# Patient Record
Sex: MALE | Race: WHITE | NOT HISPANIC OR LATINO | Employment: FULL TIME | URBAN - NONMETROPOLITAN AREA
[De-identification: names, ages, dates, MRNs, and addresses within clinical notes are randomized per-mention and may not be internally consistent; named-entity substitution may affect disease eponyms.]

---

## 2022-12-14 ENCOUNTER — OFFICE VISIT (OUTPATIENT)
Dept: CARDIOLOGY | Facility: CLINIC | Age: 52
End: 2022-12-14

## 2022-12-14 VITALS
OXYGEN SATURATION: 96 % | WEIGHT: 207.2 LBS | HEIGHT: 74 IN | BODY MASS INDEX: 26.59 KG/M2 | HEART RATE: 85 BPM | DIASTOLIC BLOOD PRESSURE: 116 MMHG | SYSTOLIC BLOOD PRESSURE: 196 MMHG

## 2022-12-14 DIAGNOSIS — E87.6 HYPOKALEMIA: ICD-10-CM

## 2022-12-14 DIAGNOSIS — I10 PRIMARY HYPERTENSION: ICD-10-CM

## 2022-12-14 DIAGNOSIS — I48.91 NEW ONSET A-FIB: Primary | ICD-10-CM

## 2022-12-14 PROCEDURE — 93000 ELECTROCARDIOGRAM COMPLETE: CPT | Performed by: NURSE PRACTITIONER

## 2022-12-14 PROCEDURE — 99204 OFFICE O/P NEW MOD 45 MIN: CPT | Performed by: NURSE PRACTITIONER

## 2022-12-14 RX ORDER — CLONIDINE 0.1 MG/24H
1 PATCH, EXTENDED RELEASE TRANSDERMAL WEEKLY
Qty: 4 PATCH | Refills: 6 | Status: SHIPPED | OUTPATIENT
Start: 2022-12-14

## 2022-12-14 RX ORDER — CYCLOBENZAPRINE HCL 10 MG
10 TABLET ORAL 3 TIMES DAILY PRN
COMMUNITY

## 2022-12-14 RX ORDER — AMLODIPINE BESYLATE 10 MG/1
10 TABLET ORAL DAILY
COMMUNITY

## 2022-12-14 RX ORDER — CLONIDINE HYDROCHLORIDE 0.1 MG/1
0.1 TABLET ORAL 3 TIMES DAILY PRN
COMMUNITY

## 2022-12-14 RX ORDER — LISINOPRIL 40 MG/1
40 TABLET ORAL 2 TIMES DAILY
COMMUNITY

## 2022-12-14 RX ORDER — HYDROCHLOROTHIAZIDE 25 MG/1
25 TABLET ORAL DAILY
COMMUNITY
End: 2022-12-14 | Stop reason: SDUPTHER

## 2022-12-14 RX ORDER — TRAMADOL HYDROCHLORIDE 50 MG/1
50 TABLET ORAL EVERY 6 HOURS PRN
COMMUNITY

## 2022-12-14 RX ORDER — CARVEDILOL 25 MG/1
25 TABLET ORAL DAILY
COMMUNITY

## 2022-12-14 NOTE — PROGRESS NOTES
Subjective     Robson Burton is a 52 y.o. male who presents to day for Atrial Fibrillation and Hypertension.    CHIEF COMPLIANT  Chief Complaint   Patient presents with   • Atrial Fibrillation   • Hypertension       Active Problems:  Problem List Items Addressed This Visit    None  Visit Diagnoses     New onset a-fib (HCC)    -  Primary    Relevant Medications    carvedilol (COREG) 25 MG tablet    amLODIPine (NORVASC) 10 MG tablet    Other Relevant Orders    Adult Transthoracic Echo Complete W/ Cont if Necessary Per Protocol    Stress Test With Myocardial Perfusion One Day    Cardiac Event Monitor    Primary hypertension        Relevant Medications    lisinopril (PRINIVIL,ZESTRIL) 40 MG tablet    carvedilol (COREG) 25 MG tablet    amLODIPine (NORVASC) 10 MG tablet    cloNIDine (CATAPRES) 0.1 MG tablet    cloNIDine (CATAPRES-TTS) 0.1 MG/24HR patch    Other Relevant Orders    Adult Transthoracic Echo Complete W/ Cont if Necessary Per Protocol    Stress Test With Myocardial Perfusion One Day    Cardiac Event Monitor    Hypokalemia        Relevant Orders    Basic Metabolic Panel          HPI  HPI   Mr. Robson Burton is a 52-year-old-male who presents to clinic today, accompanied by an adult female. for new onset atrial fibrillation as a new patient. He is currently anticoagulated with Eliquis rate controlled with carvedilol. He also have chronic arterial hypertension which he has amlodipine, carvedilol, lisinopril, and hydrochlorothiazide. Today on arrival, his blood pressure is substantially elevated at 196/116 mmHg, heart rate of 85 bpm. He also takes clonidine as needed.     The patient acknowledges completing an echocardiogram which showed atrial fibrillation. He states he was prescribed Eliquis. He denies being informed about atrial fibrillation. He denies chest discomfort or chest tightness.    No signs of infection on labs. Hemoglobin is slightly high. CBC is unremarkable. Normal platelets. Kidney function  is slightly off at 1.37.  Which gives him and estimated GFR of 61, 16 above is considered normal. Creatinine is slightly elevated, overall filtration of the kidneys is at the bottom end of normal, so slight kidney compromise. Potassium is 2.9. He denies being given potassium. He denies having it rechecked since they treated the potassium.    The patient has a CHADS2-VASC score of 1 which puts him at a 0.6% chance of having a stroke. Has Bled score is a 2 which puts him at 1.88% of bleeding per year.    He is taking hydrochlorothiazide. He denies any swelling. The adult female states that he gets up to urinate about 3 to 4 times in the middle of the night while taking this medication.  He is currently on amlodipine, carvedilol, lisinopril for his chronic arterial hypertension. He denies his blood pressure being as high as it was today. At home he states it's 168/95 mmHg, so still elevated.     He denies any chest pain, shortness of breath, lower extremity edema, palpitations, fatigue, dizziness, lightheadedness, syncope, PND, orthopnea, or strokelike symptoms.    PRIOR MEDS  Current Outpatient Medications on File Prior to Visit   Medication Sig Dispense Refill   • amLODIPine (NORVASC) 10 MG tablet Take 10 mg by mouth Daily.     • apixaban (ELIQUIS) 5 MG tablet tablet Take 5 mg by mouth 2 (Two) Times a Day.     • carvedilol (COREG) 25 MG tablet Take 25 mg by mouth Daily.     • cloNIDine (CATAPRES) 0.1 MG tablet Take 0.1 mg by mouth 3 (Three) Times a Day As Needed for High Blood Pressure. Take 1-2 tabs po up to three times a day prn     • cyclobenzaprine (FLEXERIL) 10 MG tablet Take 10 mg by mouth 3 (Three) Times a Day As Needed for Muscle Spasms.     • lisinopril (PRINIVIL,ZESTRIL) 40 MG tablet Take 40 mg by mouth 2 (Two) Times a Day.     • traMADol (ULTRAM) 50 MG tablet Take 50 mg by mouth Every 6 (Six) Hours As Needed for Moderate Pain.       No current facility-administered medications on file prior to visit.  "      ALLERGIES  Patient has no known allergies.    HISTORY  Past Medical History:   Diagnosis Date   • Arrhythmia     a fib   • Hyperlipidemia    • Hypertension        Social History     Socioeconomic History   • Marital status:    Tobacco Use   • Smoking status: Every Day     Packs/day: 1.00     Years: 20.00     Pack years: 20.00     Types: Cigarettes   • Smokeless tobacco: Never   Vaping Use   • Vaping Use: Never used   Substance and Sexual Activity   • Alcohol use: Never   • Drug use: Not Currently     Comment: loratab   • Sexual activity: Defer       Family History   Problem Relation Age of Onset   • Hypertension Mother    • Diabetes Mother    • Heart disease Father    • Heart attack Father         stents       Review of Systems   Constitutional: Negative for chills, fatigue and fever.   HENT: Negative for congestion, rhinorrhea and sore throat.    Respiratory: Negative for chest tightness and shortness of breath.    Cardiovascular: Negative for chest pain, palpitations and leg swelling.   Gastrointestinal: Negative for constipation, diarrhea and nausea.   Musculoskeletal: Positive for back pain. Negative for arthralgias and neck pain.   Allergic/Immunologic: Positive for environmental allergies. Negative for food allergies.   Neurological: Negative for dizziness, syncope, weakness and light-headedness.   Hematological: Does not bruise/bleed easily.   Psychiatric/Behavioral: Negative for sleep disturbance.       Objective     VITALS: BP (!) 196/116 (BP Location: Right arm, Patient Position: Sitting, Cuff Size: Adult)   Pulse 85   Ht 188 cm (74\")   Wt 94 kg (207 lb 3.2 oz)   SpO2 96%   BMI 26.60 kg/m²     LABS:   Lab Results (most recent)     None          IMAGING:   No Images in the past 120 days found..    EXAM:  Physical Exam  Vitals and nursing note reviewed.   Constitutional:       Appearance: He is well-developed.   HENT:      Head: Normocephalic.   Eyes:      Pupils: Pupils are equal, round, " and reactive to light.   Neck:      Thyroid: No thyroid mass.      Vascular: No carotid bruit or JVD.      Trachea: Trachea and phonation normal.   Cardiovascular:      Rate and Rhythm: Normal rate and regular rhythm.      Pulses:           Radial pulses are 2+ on the right side and 2+ on the left side.        Posterior tibial pulses are 2+ on the right side and 2+ on the left side.      Heart sounds: Normal heart sounds. No murmur heard.    No friction rub. No gallop.   Pulmonary:      Effort: Pulmonary effort is normal. No respiratory distress.      Breath sounds: Normal breath sounds. No wheezing or rales.   Abdominal:      General: Bowel sounds are normal.      Palpations: Abdomen is soft.   Musculoskeletal:         General: No swelling. Normal range of motion.      Cervical back: Neck supple.   Skin:     General: Skin is warm and dry.      Capillary Refill: Capillary refill takes less than 2 seconds.      Findings: No rash.   Neurological:      Mental Status: He is alert and oriented to person, place, and time.   Psychiatric:         Speech: Speech normal.         Behavior: Behavior normal.         Thought Content: Thought content normal.         Judgment: Judgment normal.              Procedure     ECG 12 Lead    Date/Time: 12/14/2022 12:00 PM  Performed by: Santy Banegas APRN  Authorized by: Santy Banegas APRN   Comparison: compared with previous ECG from 10/22/2022  Comparison to previous ECG: Previously in atrial fibrillation with RVR  Rhythm: sinus rhythm  Rate: normal  BPM: 84  QRS axis: normal  Other findings: left ventricular hypertrophy  Comments:  ms  No acute changes          Assessment  1. Patient was shown to have atrial fibrillation with rapid ventricular response on echocardiogram. His rate got up to 131bpm on echocardiogram. Discussed atrial fibrillation in depth with patient.   2. Patient is not in atrial fibrillation today. I explained he has paroxysmal, where it comes and goes.  Discussed figuring out triggers and what may cause his atrial fibrillation.   3. Advised the patient to call right away if he notices that he is going in and out of A. fib and I will send in the medication and perform an EKG about 3 days after starting the medication, however explained that I will not put him on now because he might not have A. fib for a period amount of time.   4. Reviewed and discussed labs with the patient as well as treatment for any abnormal lab results.             Assessment & Plan    Diagnosis Plan   1. New onset a-fib (HCC)  Adult Transthoracic Echo Complete W/ Cont if Necessary Per Protocol    Stress Test With Myocardial Perfusion One Day    Cardiac Event Monitor      2. Primary hypertension  Adult Transthoracic Echo Complete W/ Cont if Necessary Per Protocol    Stress Test With Myocardial Perfusion One Day    Cardiac Event Monitor    cloNIDine (CATAPRES-TTS) 0.1 MG/24HR patch      3. Hypokalemia  Basic Metabolic Panel      Plan  1. Recommend journaling when he experiences atrial fibrillation and try to identify any potential symptoms..  2. Discussed an option of using antiarrhythmic therapy like flecainide and sotalol to help prevent issue.  However since patient is only had 1 episode of atrial fibrillation will defer antiarrhythmic therapy at this time.  However if it does recur we will discuss further at that time.  3. Discussed sending patient to an electrophysiologist if we are not able to control atrial fibrillation with medication to do an ablation. Explained it is a very invasive procedure.  4. Discussed another option is cardioversion where the heart is shocked back into rhythm, however it is not needed presently.  5. Discussed performing an echocardiogram to make sure heart structure is normal, no blood clots are present, pump function is normal, and no heart failure.  The echocardiogram would also rule out thrombotic burden.  6. Would provide heart monitor for 14 days. If going  in and out of A. fib then antiarrhythmic therapy is appropriate.  At this time we will continue aspirin for antiplatelet therapy.  If he does come back to have diastolic dysfunction with heart failure we will need to consider DOAC therapy.  7. Will stop hydrochlorothiazide due to no swelling and throwing off his electrolytes.   8. Discussed other symptoms of angina we will move forth with further testing at that time.  9. Will switch lisinopril to Olmesartan to see if we can better control his blood pressure.  He will monitor his blood pressure on a routine basis and report any guys or lows.  10. Will change Clonidine pill to a weekly patch.  11.  Informed of signs and symptoms of ACS and advised to seek emergent treatment for any new worsening symptoms.  Patient also advised sooner follow-up as needed.  Also advised to follow-up with family doctor as needed  This note is dictated utilizing voice recognition software.  Although this record has been proof read, transcriptional errors may still be present. If questions occur regarding the content of this record please do not hesitate to call our office.  I have reviewed and confirmed the accuracy of the ROS as documented by the MA/LPN/RN ELEAZAR Cruz      Return in about 3 months (around 3/14/2023), or if symptoms worsen or fail to improve.    Diagnoses and all orders for this visit:    1. New onset a-fib (HCC) (Primary)  -     Adult Transthoracic Echo Complete W/ Cont if Necessary Per Protocol; Future  -     Stress Test With Myocardial Perfusion One Day; Future  -     Cardiac Event Monitor; Future    2. Primary hypertension  -     Adult Transthoracic Echo Complete W/ Cont if Necessary Per Protocol; Future  -     Stress Test With Myocardial Perfusion One Day; Future  -     Cardiac Event Monitor; Future  -     cloNIDine (CATAPRES-TTS) 0.1 MG/24HR patch; Place 1 patch on the skin as directed by provider 1 (One) Time Per Week.  Dispense: 4 patch; Refill: 6    3.  Hypokalemia  -     Basic Metabolic Panel; Future    Other orders  -     ECG 12 Lead        Robson Burton  reports that he has been smoking cigarettes. He has a 20.00 pack-year smoking history. He has never used smokeless tobacco.. I have educated him on the risk of diseases from using tobacco products.      MEDS ORDERED DURING VISIT:  New Medications Ordered This Visit   Medications   • cloNIDine (CATAPRES-TTS) 0.1 MG/24HR patch     Sig: Place 1 patch on the skin as directed by provider 1 (One) Time Per Week.     Dispense:  4 patch     Refill:  6           This document has been electronically signed by ELEAZAR Cruz Jr.  December 15, 2022 22:26 EST      Transcribed from ambient dictation for ELEAZAR Cruz by Kerry Ryan.  12/14/22   18:06 EST    Patient or patient representative verbalized consent to the visit recording.  I have personally performed the services described in this document as transcribed by the above individual, and it is both accurate and complete.

## 2022-12-22 ENCOUNTER — TELEPHONE (OUTPATIENT)
Dept: CARDIOLOGY | Facility: CLINIC | Age: 52
End: 2022-12-22

## 2022-12-22 DIAGNOSIS — I47.20 VT (VENTRICULAR TACHYCARDIA): Primary | ICD-10-CM

## 2022-12-22 NOTE — TELEPHONE ENCOUNTER
Patient has 205.0 heart rate at 9:19 am . I called patient he was at work doing very physical activity . Per JR we will continue the same with medications and continue to watch the monitor.    Ruby MAHONEY

## 2022-12-27 ENCOUNTER — TELEPHONE (OUTPATIENT)
Dept: CARDIOLOGY | Facility: CLINIC | Age: 52
End: 2022-12-27

## 2022-12-27 NOTE — TELEPHONE ENCOUNTER
"Received multiple faxed from [facility] showing criticals- AFiB w/ RVR sustained.     Per chart review, pt was referred to our office due to new onset AFiB.     OV note from 12/14/22 states:  \"Would provide heart monitor for 14 days. If going in and out of A. fib then antiarrhythmic therapy is appropriate.  At this time we will continue aspirin for antiplatelet therapy.  If he does come back to have diastolic dysfunction with heart failure we will need to consider DOAC therapy.\"      Per JR:   Confirm that pt is taking his Eliquis. Try to expedite testing for sooner than 2/6/23.  Start Sotalol 80mg BID on Monday. Nurse visit Tues, Wed, Thurs.   See if pt is symptomatic, if so, may change Carvedilol to Metoprolol Tart 100mg BID.         Called pt, informed him of going in and out of AFiB, he is asymptomatic.  He is taking his Eliquis 5mg daily. He will start Sotalol 80mg BID on Monday, then return Tues, Wed, Thurs for EKG.   "

## 2022-12-30 ENCOUNTER — HOSPITAL ENCOUNTER (OUTPATIENT)
Dept: CARDIOLOGY | Facility: HOSPITAL | Age: 52
Discharge: HOME | End: 2022-12-30

## 2022-12-30 VITALS — BODY MASS INDEX: 26.6 KG/M2 | HEIGHT: 74 IN | WEIGHT: 207.23 LBS

## 2022-12-30 DIAGNOSIS — I48.91 NEW ONSET A-FIB: ICD-10-CM

## 2022-12-30 DIAGNOSIS — I10 PRIMARY HYPERTENSION: ICD-10-CM

## 2022-12-30 PROCEDURE — 0 TECHNETIUM SESTAMIBI: Performed by: INTERNAL MEDICINE

## 2022-12-30 PROCEDURE — 78452 HT MUSCLE IMAGE SPECT MULT: CPT | Performed by: INTERNAL MEDICINE

## 2022-12-30 PROCEDURE — 93306 TTE W/DOPPLER COMPLETE: CPT | Performed by: SPECIALIST

## 2022-12-30 PROCEDURE — A9500 TC99M SESTAMIBI: HCPCS | Performed by: INTERNAL MEDICINE

## 2022-12-30 PROCEDURE — 93356 MYOCRD STRAIN IMG SPCKL TRCK: CPT

## 2022-12-30 PROCEDURE — 78452 HT MUSCLE IMAGE SPECT MULT: CPT

## 2022-12-30 PROCEDURE — 93017 CV STRESS TEST TRACING ONLY: CPT

## 2022-12-30 PROCEDURE — 93018 CV STRESS TEST I&R ONLY: CPT | Performed by: INTERNAL MEDICINE

## 2022-12-30 PROCEDURE — 93306 TTE W/DOPPLER COMPLETE: CPT

## 2022-12-30 PROCEDURE — 93356 MYOCRD STRAIN IMG SPCKL TRCK: CPT | Performed by: SPECIALIST

## 2022-12-30 RX ADMIN — TECHNETIUM TC 99M SESTAMIBI 1 DOSE: 1 INJECTION INTRAVENOUS at 10:13

## 2022-12-30 RX ADMIN — TECHNETIUM TC 99M SESTAMIBI 1 DOSE: 1 INJECTION INTRAVENOUS at 12:53

## 2023-01-03 ENCOUNTER — CLINICAL SUPPORT (OUTPATIENT)
Dept: CARDIOLOGY | Facility: CLINIC | Age: 53
End: 2023-01-03
Payer: COMMERCIAL

## 2023-01-03 VITALS
HEIGHT: 74 IN | SYSTOLIC BLOOD PRESSURE: 151 MMHG | DIASTOLIC BLOOD PRESSURE: 89 MMHG | OXYGEN SATURATION: 98 % | WEIGHT: 213.6 LBS | BODY MASS INDEX: 27.41 KG/M2

## 2023-01-03 DIAGNOSIS — E87.6 HYPOKALEMIA: ICD-10-CM

## 2023-01-03 DIAGNOSIS — I48.91 NEW ONSET A-FIB: Primary | ICD-10-CM

## 2023-01-03 DIAGNOSIS — I47.20 VT (VENTRICULAR TACHYCARDIA): ICD-10-CM

## 2023-01-03 DIAGNOSIS — I10 PRIMARY HYPERTENSION: ICD-10-CM

## 2023-01-03 PROCEDURE — 93000 ELECTROCARDIOGRAM COMPLETE: CPT | Performed by: NURSE PRACTITIONER

## 2023-01-03 NOTE — PROGRESS NOTES
Robson Burton  1970  1/3/2023   ?   Chief Complaint   Patient presents with   • Nurse visit     EKG- Sotalol       ?   HPI:   ? Per chart review, telephone encounter from 12/27/22 states:  \"Received multiple faxed from [facility] [employer] showing criticals- AFiB w/ RVR sustained.      Per chart review, pt was referred to our office due to new onset AFiB.      OV note from 12/14/22 states:  \"Would provide heart monitor for 14 days. If going in and out of A. fib then antiarrhythmic therapy is appropriate.  At this time we will continue aspirin for antiplatelet therapy.  If he does come back to have diastolic dysfunction with heart failure we will need to consider DOAC therapy.\"        Per JR:   Confirm that pt is taking his Eliquis. Try to expedite testing for sooner than 2/6/23.  Start Sotalol 80mg BID on Monday. Nurse visit Tues, Wed, Thurs.   See if pt is symptomatic, if so, may change Carvedilol to Metoprolol Tart 100mg BID.            Called pt, informed him of going in and out of AFiB, he is asymptomatic.  He is taking his Eliquis 5mg daily. He will start Sotalol 80mg BID on Monday, then return Tues, Wed, Thurs for EKG.\"  ?   ?     Current Outpatient Medications:   •  amLODIPine (NORVASC) 10 MG tablet, Take 10 mg by mouth Daily., Disp: , Rfl:   •  apixaban (ELIQUIS) 5 MG tablet tablet, Take 5 mg by mouth 2 (Two) Times a Day., Disp: , Rfl:   •  carvedilol (COREG) 25 MG tablet, Take 25 mg by mouth Daily., Disp: , Rfl:   •  cloNIDine (CATAPRES) 0.1 MG tablet, Take 0.1 mg by mouth 3 (Three) Times a Day As Needed for High Blood Pressure. Take 1-2 tabs po up to three times a day prn, Disp: , Rfl:   •  cloNIDine (CATAPRES-TTS) 0.1 MG/24HR patch, Place 1 patch on the skin as directed by provider 1 (One) Time Per Week., Disp: 4 patch, Rfl: 6  •  cyclobenzaprine (FLEXERIL) 10 MG tablet, Take 10 mg by mouth 3 (Three) Times a Day As Needed for Muscle Spasms., Disp: , Rfl:   •  lisinopril (PRINIVIL,ZESTRIL) 40 MG tablet, Take  40 mg by mouth 2 (Two) Times a Day., Disp: , Rfl:   •  Sotalol HCl AF 80 MG tablet, Take 80mg BID- starting Monday 1/2/23., Disp: 180 tablet, Rfl: 3  •  traMADol (ULTRAM) 50 MG tablet, Take 50 mg by mouth Every 6 (Six) Hours As Needed for Moderate Pain., Disp: , Rfl:    ?   ?   Patient has no known allergies.         ECG 12 Lead    Date/Time: 1/3/2023 9:28 AM  Performed by: Santy Banegas APRN  Authorized by: Santy Banegas APRN   Comparison: compared with previous ECG from 12/14/2022  Similar to previous ECG  Rhythm: sinus rhythm  Rate: normal  BPM: 80  QRS axis: normal  Other findings: left ventricular hypertrophy  Comments: AFiB   QTC manually 400 ms  No acute changes               ?   Assessment & Plan    ?   1. ? AFiB    Pt in office for 1/3 EKG since starting Sotalol 80mg BID on Monday 1/2/23.  Pt states he started taking his Sotalol yesterday, and can't feel a difference. Pt was asymptomatic prior to starting Sotalol. Pt denies having any new or worsening issues to go over.     Performed EKG.     Per verbal order from ELEAZAR Lucas:  Come back in tomorrow for second EKG, as planned.     I informed pt of the above, he stated he will be here tomorrow at 2pm, but he may not make it Thursday. I told him that we will discuss that tomorrow, as if EKG is good, he may not need Thurs. He stated he can go to PCP for EKG Thurs, if needed.     Informed pt of the above instructions. They verbalized understanding and are aware of plan.   - MARU Yeager   ?

## 2023-01-04 ENCOUNTER — TELEPHONE (OUTPATIENT)
Dept: CARDIOLOGY | Facility: CLINIC | Age: 53
End: 2023-01-04
Payer: COMMERCIAL

## 2023-01-04 ENCOUNTER — CLINICAL SUPPORT (OUTPATIENT)
Dept: CARDIOLOGY | Facility: CLINIC | Age: 53
End: 2023-01-04
Payer: COMMERCIAL

## 2023-01-04 DIAGNOSIS — I47.20 VT (VENTRICULAR TACHYCARDIA): ICD-10-CM

## 2023-01-04 DIAGNOSIS — E87.6 HYPOKALEMIA: ICD-10-CM

## 2023-01-04 DIAGNOSIS — I48.91 NEW ONSET A-FIB: Primary | ICD-10-CM

## 2023-01-04 LAB
BH CV REST NUCLEAR ISOTOPE DOSE: 10 MCI
BH CV STRESS DURATION MIN STAGE 1: 3
BH CV STRESS DURATION SEC STAGE 1: 0
BH CV STRESS GRADE STAGE 1: 10
BH CV STRESS METS STAGE 1: 5
BH CV STRESS NUCLEAR ISOTOPE DOSE: 30 MCI
BH CV STRESS PROTOCOL 1: NORMAL
BH CV STRESS RECOVERY BP: NORMAL MMHG
BH CV STRESS RECOVERY HR: 90 BPM
BH CV STRESS SPEED STAGE 1: 1.7
BH CV STRESS STAGE 1: 1
MAXIMAL PREDICTED HEART RATE: 166 BPM
PERCENT MAX PREDICTED HR: 70.48 %
STRESS BASELINE BP: NORMAL MMHG
STRESS BASELINE HR: 83 BPM
STRESS PERCENT HR: 83 %
STRESS POST ESTIMATED WORKLOAD: 10.1 METS
STRESS POST EXERCISE DUR MIN: 9 MIN
STRESS POST EXERCISE DUR SEC: 15 SEC
STRESS POST PEAK BP: NORMAL MMHG
STRESS POST PEAK HR: 117 BPM
STRESS TARGET HR: 141 BPM

## 2023-01-11 ENCOUNTER — TELEPHONE (OUTPATIENT)
Dept: CARDIOLOGY | Facility: CLINIC | Age: 53
End: 2023-01-11
Payer: COMMERCIAL

## 2023-01-11 NOTE — TELEPHONE ENCOUNTER
----- Message from ELEAZAR Cruz sent at 1/10/2023  8:28 AM EST -----  Patient was found to have nonsustained atrial fibrillation with RVR.  As well as an 11 beat run of nonsustained ventricular tachycardia.  2-week follow-up.

## 2023-01-11 NOTE — TELEPHONE ENCOUNTER
Santy Banegas APRN [place], [place], [place]  Patient was found to have nonsustained atrial fibrillation with RVR.  As well as an 11 beat run of nonsustained ventricular tachycardia.  2-week follow-up.     I called patient advised of above findings as well as the need for two week apt. I will have front office call with apt.    Ruby MAHONEY

## 2023-01-11 NOTE — TELEPHONE ENCOUNTER
STRESS  Pt notified of no acute findings. Provider will discuss results at f/u. Pt reminded of appt date and time.  ----- Message from Fariba Hammer sent at 1/9/2023 10:56 AM EST -----    ----- Message -----  From: Santy Banegas APRN  Sent: 1/5/2023   9:01 AM EST  To: Rody Rao MA    No signs of ischemia.  Mildly depressed post-rest ejection fraction 48%.  Keep follow-up.

## 2023-01-16 ENCOUNTER — TELEPHONE (OUTPATIENT)
Dept: CARDIOLOGY | Facility: CLINIC | Age: 53
End: 2023-01-16
Payer: COMMERCIAL

## 2023-01-16 NOTE — TELEPHONE ENCOUNTER
----- Message from ELEAZAR Cruz sent at 1/16/2023  9:44 AM EST -----  TSH is mildly elevated at 4.86.  Glucose 288, elevated creatinine 1.38 with a normal GFR of 61.  Though sodium and chloride were mildly reduced sodium 133 chloride 93.    I called and went over above lab results with the patient as listed above.TSH  Elevated at 4.86 Glucose 288 and elvated creatinine 1.38 with a normal GFR of 61.Sodium and chloride were mildly reduced.    Ruby ANDREA

## 2023-01-18 LAB
AORTIC DIMENSIONLESS INDEX: 0.94 (DI)
BH CV ECHO LEFT VENTRICLE GLOBAL LONGITUDINAL STRAIN: -8 %
BH CV ECHO MEAS - ACS: 1.97 CM
BH CV ECHO MEAS - AO MAX PG: 4.9 MMHG
BH CV ECHO MEAS - AO MEAN PG: 2.8 MMHG
BH CV ECHO MEAS - AO ROOT DIAM: 3.2 CM
BH CV ECHO MEAS - AO V2 MAX: 110.1 CM/SEC
BH CV ECHO MEAS - AO V2 VTI: 20.1 CM
BH CV ECHO MEAS - EDV(CUBED): 124.8 ML
BH CV ECHO MEAS - EF_3D-VOL: 46 %
BH CV ECHO MEAS - ESV(CUBED): 45.9 ML
BH CV ECHO MEAS - FS: 28.4 %
BH CV ECHO MEAS - IVS/LVPW: 1.03 CM
BH CV ECHO MEAS - IVSD: 1.43 CM
BH CV ECHO MEAS - LA DIMENSION: 4.3 CM
BH CV ECHO MEAS - LAT PEAK E' VEL: 3.3 CM/SEC
BH CV ECHO MEAS - LV MASS(C)D: 295.2 GRAMS
BH CV ECHO MEAS - LV MAX PG: 4.3 MMHG
BH CV ECHO MEAS - LV MEAN PG: 1.79 MMHG
BH CV ECHO MEAS - LV V1 MAX: 103.4 CM/SEC
BH CV ECHO MEAS - LV V1 VTI: 19.1 CM
BH CV ECHO MEAS - LVIDD: 5 CM
BH CV ECHO MEAS - LVIDS: 3.6 CM
BH CV ECHO MEAS - LVPWD: 1.39 CM
BH CV ECHO MEAS - MED PEAK E' VEL: 3.4 CM/SEC
BH CV ECHO MEAS - MV A MAX VEL: 87.5 CM/SEC
BH CV ECHO MEAS - MV DEC SLOPE: 315.8 CM/SEC2
BH CV ECHO MEAS - MV DEC TIME: 0.17 MSEC
BH CV ECHO MEAS - MV E MAX VEL: 45.7 CM/SEC
BH CV ECHO MEAS - MV E/A: 0.52
BH CV ECHO MEAS - MV MAX PG: 4 MMHG
BH CV ECHO MEAS - MV MEAN PG: 1.61 MMHG
BH CV ECHO MEAS - MV P1/2T: 56.4 MSEC
BH CV ECHO MEAS - MV V2 VTI: 20.7 CM
BH CV ECHO MEAS - MVA(P1/2T): 3.9 CM2
BH CV ECHO MEAS - PA V2 MAX: 106.3 CM/SEC
BH CV ECHO MEAS - RAP SYSTOLE: 10 MMHG
BH CV ECHO MEAS - RV MAX PG: 2.04 MMHG
BH CV ECHO MEAS - RV V1 MAX: 71.4 CM/SEC
BH CV ECHO MEAS - RV V1 VTI: 15.2 CM
BH CV ECHO MEAS - RVDD: 3.4 CM
BH CV ECHO MEAS - RVSP: 14.3 MMHG
BH CV ECHO MEAS - TAPSE (>1.6): 2.28 CM
BH CV ECHO MEAS - TR MAX PG: 4.3 MMHG
BH CV ECHO MEAS - TR MAX VEL: 103.4 CM/SEC
BH CV ECHO MEASUREMENTS AVERAGE E/E' RATIO: 13.64
BH CV XLRA - TDI S': 13.6 CM/SEC
MAXIMAL PREDICTED HEART RATE: 166 BPM
SINUS: 3.5 CM
STRESS TARGET HR: 141 BPM

## 2023-01-26 ENCOUNTER — TELEPHONE (OUTPATIENT)
Dept: CARDIOLOGY | Facility: CLINIC | Age: 53
End: 2023-01-26
Payer: COMMERCIAL

## 2023-01-26 NOTE — TELEPHONE ENCOUNTER
----- Message from ELEAZAR Cruz sent at 1/25/2023  1:21 AM EST -----  Echocardiogram is consistent with nonobstructive hypertrophic cardiomyopathy.  Would like to bring patient and in the next 2 weeks to discuss further.  Ejection fraction 56 to 60%.    I called patient and advised of above Echo results . I explained that JR would go over the results more in detail with him at his apt in two weeks.      Ruby MAHONEY

## 2023-01-31 ENCOUNTER — PRIOR AUTHORIZATION (OUTPATIENT)
Dept: CARDIOLOGY | Facility: CLINIC | Age: 53
End: 2023-01-31
Payer: COMMERCIAL

## 2023-01-31 NOTE — TELEPHONE ENCOUNTER
Prior authorization request received for . Authorization submitted through [employer]. Status pending.      The request has been approved. The authorization is effective for a maximum of 12 fills from 01/30/2023 to 01/29/2024, as long as the member is enrolled in their current health plan. The request was reviewed and approved by a licensed clinical pharmacist. This request has been approved with a quantity limit of 4 patches per 28 days. A written notification letter will follow with additional details.

## 2023-04-12 ENCOUNTER — OFFICE VISIT (OUTPATIENT)
Dept: CARDIOLOGY | Facility: CLINIC | Age: 53
End: 2023-04-12
Payer: MEDICAID

## 2023-04-12 VITALS
HEIGHT: 74 IN | DIASTOLIC BLOOD PRESSURE: 81 MMHG | BODY MASS INDEX: 27 KG/M2 | SYSTOLIC BLOOD PRESSURE: 124 MMHG | WEIGHT: 210.4 LBS | OXYGEN SATURATION: 100 % | HEART RATE: 79 BPM

## 2023-04-12 DIAGNOSIS — I47.20 VT (VENTRICULAR TACHYCARDIA): ICD-10-CM

## 2023-04-12 DIAGNOSIS — I10 PRIMARY HYPERTENSION: ICD-10-CM

## 2023-04-12 DIAGNOSIS — I48.91 NEW ONSET A-FIB: Primary | ICD-10-CM

## 2023-04-12 PROCEDURE — 1160F RVW MEDS BY RX/DR IN RCRD: CPT | Performed by: NURSE PRACTITIONER

## 2023-04-12 PROCEDURE — 1159F MED LIST DOCD IN RCRD: CPT | Performed by: NURSE PRACTITIONER

## 2023-04-12 PROCEDURE — 99213 OFFICE O/P EST LOW 20 MIN: CPT | Performed by: NURSE PRACTITIONER

## 2023-04-12 PROCEDURE — 93000 ELECTROCARDIOGRAM COMPLETE: CPT | Performed by: NURSE PRACTITIONER

## 2023-04-12 NOTE — PROGRESS NOTES
Subjective     Robson Jimenez is a 52 y.o. male who presents to day for 3 month testing & monitor follow up and Atrial Fibrillation.    CHIEF COMPLIANT  Chief Complaint   Patient presents with   • 3 month testing & monitor follow up   • Atrial Fibrillation       Active Problems:  Problem List Items Addressed This Visit    None  Visit Diagnoses     New onset a-fib    -  Primary    Relevant Medications    Sotalol HCl AF 80 MG tablet    apixaban (ELIQUIS) 5 MG tablet tablet    Other Relevant Orders    ECG 12 Lead    VT (ventricular tachycardia)        Relevant Medications    Sotalol HCl AF 80 MG tablet    Other Relevant Orders    ECG 12 Lead    Primary hypertension        Relevant Medications    Sotalol HCl AF 80 MG tablet    Other Relevant Orders    ECG 12 Lead      Problem list :  1.New onset A Fib  1.1 cardiac event monitor 12/22: Accidental pushes during sinus mechanism without ectopy, patient was found to have atrial fibrillation with RVR 7% of the time.  Patient had 1 run of ventricular tachycardia lasting 11 beats with rates up to 205 bpm.,  Tachycardic 27% of the time  1.2 stress test 12/22: Negative for ischemia mildly depressed post-rest ejection fraction of 48% with anterior septal and septal hypokinesis  2.Primary Hypertension  2.1 echocardiogram 12/22: EF 56 to 60%, grade 1A diastolic dysfunction  3.Hypokalemia      HPI  HPI     ROBSON JIMENEZ is a 52-year-old male patient being followed up today for atrial fibrillation, which is a new onset in 12/2022. The patient presents today for a 3 month follow-up for cardiac testing and atrial fibrillation. He is on Eliquis for anticoagulation, carvedilol for rate control, and sotalol for antiarrhythmic therapy. He does have chronic arterial hypertension, which he is on carvedilol, lisinopril, amlodipine, and clonidine patch. Today, his blood pressure is well controlled at 124/81 mmHg, and his heart rate is 79 bpm. The patient did go under cardiac monitor, which  7 percent of the time he is in atrial fibrillation with RVR. The sotalol was started after these findings. He did have ventricular tachycardia that lasted 11 beats with rates up to 205 bpm, that was not since. Tachycardic burden was 27 percent. Less than 1 percent PVC, PAC, Burdens.    The patient's most recent echocardiogram was reviewed with him today. His ejection fraction was 56 to 60 percent. His left ventricle was slightly larger than normal. His filling pressures were slightly elevated at 1 out of 4, which may account for slight edema in his lower extremities. His pulmonary pressures were less than 35 mmHg, which is considered normal. No major valvular disease. The mitral valve leaked slightly, mild. The tricuspid valve had trace leakage. His ejection fraction decreased to 48 percent with exertion.    The patient reports he is tolerating sotalol well and denies any bleeding while taking Eliquis. He states he can tell a change in himself since he has been taking it. He denies experiencing palpitations. He confirms requiring refills on his medications    PRIOR MEDS  Current Outpatient Medications on File Prior to Visit   Medication Sig Dispense Refill   • amLODIPine (NORVASC) 10 MG tablet Take 1 tablet by mouth Daily.     • carvedilol (COREG) 25 MG tablet Take 1 tablet by mouth 2 (Two) Times a Day With Meals.     • cloNIDine (CATAPRES) 0.1 MG tablet Take 1 tablet by mouth 3 (Three) Times a Day As Needed for High Blood Pressure. Take 1-2 tabs po up to three times a day prn     • cloNIDine (CATAPRES-TTS) 0.1 MG/24HR patch Place 1 patch on the skin as directed by provider 1 (One) Time Per Week. 4 patch 6   • cyclobenzaprine (FLEXERIL) 10 MG tablet Take 1 tablet by mouth 3 (Three) Times a Day As Needed for Muscle Spasms.     • empagliflozin (JARDIANCE) 25 MG tablet tablet Take 1 tablet by mouth Daily.     • lisinopril (PRINIVIL,ZESTRIL) 40 MG tablet Take 1 tablet by mouth 2 (Two) Times a Day.     • metFORMIN  (GLUCOPHAGE) 500 MG tablet Take 1 tablet by mouth 2 (Two) Times a Day With Meals. 2 tabs po bid     • traMADol (ULTRAM) 50 MG tablet Take 1 tablet by mouth Every 6 (Six) Hours As Needed for Moderate Pain.       No current facility-administered medications on file prior to visit.       ALLERGIES  Patient has no known allergies.    HISTORY  Past Medical History:   Diagnosis Date   • Arrhythmia     a fib   • Hyperlipidemia    • Hypertension        Social History     Socioeconomic History   • Marital status:    Tobacco Use   • Smoking status: Every Day     Packs/day: 1.00     Years: 20.00     Pack years: 20.00     Types: Cigarettes   • Smokeless tobacco: Never   Vaping Use   • Vaping Use: Never used   Substance and Sexual Activity   • Alcohol use: Never   • Drug use: Not Currently     Comment: loratab   • Sexual activity: Defer       Family History   Problem Relation Age of Onset   • Hypertension Mother    • Diabetes Mother    • Heart disease Father    • Heart attack Father         stents       Review of Systems   Constitutional: Negative.  Negative for chills, fatigue and fever.   HENT: Negative.  Negative for congestion, rhinorrhea and sore throat.    Eyes: Negative.  Negative for visual disturbance.   Respiratory: Negative.  Negative for chest tightness and shortness of breath.    Cardiovascular: Negative.  Negative for chest pain, palpitations and leg swelling.   Gastrointestinal: Negative.  Negative for constipation, diarrhea and nausea.   Musculoskeletal: Positive for back pain. Negative for arthralgias and neck pain.   Allergic/Immunologic: Negative.  Negative for environmental allergies and food allergies.   Neurological: Negative.  Negative for dizziness, syncope, weakness and light-headedness.   Hematological: Negative.  Does not bruise/bleed easily.   Psychiatric/Behavioral: Negative.  Negative for sleep disturbance.       Objective     VITALS: /81 (BP Location: Left arm, Patient Position:  "Sitting, Cuff Size: Adult)   Pulse 79   Ht 188 cm (74.02\")   Wt 95.4 kg (210 lb 6.4 oz)   SpO2 100%   BMI 27.00 kg/m²     LABS:   Lab Results (most recent)     None          IMAGING:   No Images in the past 120 days found..    EXAM:  Physical Exam  Vitals and nursing note reviewed.   Constitutional:       Appearance: He is well-developed.   HENT:      Head: Normocephalic.   Eyes:      Pupils: Pupils are equal, round, and reactive to light.   Neck:      Thyroid: No thyroid mass.      Vascular: No carotid bruit or JVD.      Trachea: Trachea and phonation normal.   Cardiovascular:      Rate and Rhythm: Normal rate and regular rhythm.      Pulses:           Radial pulses are 2+ on the right side and 2+ on the left side.        Posterior tibial pulses are 2+ on the right side and 2+ on the left side.      Heart sounds: Normal heart sounds. No murmur heard.    No friction rub. No gallop.   Pulmonary:      Effort: Pulmonary effort is normal. No respiratory distress.      Breath sounds: Normal breath sounds. No wheezing or rales.   Abdominal:      General: Bowel sounds are normal.      Palpations: Abdomen is soft.   Musculoskeletal:         General: No swelling. Normal range of motion.      Cervical back: Neck supple.   Skin:     General: Skin is warm and dry.      Capillary Refill: Capillary refill takes less than 2 seconds.      Findings: No rash.   Neurological:      Mental Status: He is alert and oriented to person, place, and time.   Psychiatric:         Speech: Speech normal.         Behavior: Behavior normal.         Thought Content: Thought content normal.         Judgment: Judgment normal.         Procedure     ECG 12 Lead    Date/Time: 4/12/2023 10:51 AM  Performed by: Santy Banegas APRN  Authorized by: Santy Banegas APRN   Comparison: compared with previous ECG from 1/4/2023  Similar to previous ECG  Rhythm: sinus rhythm  Rate: normal  BPM: 75  QRS axis: normal  Other findings: left ventricular " hypertrophy  Comments: QTc 447 ms  No acute changes               Assessment & Plan         Diagnosis Plan   1. New onset a-fib  ECG 12 Lead    Sotalol HCl AF 80 MG tablet    apixaban (ELIQUIS) 5 MG tablet tablet      2. VT (ventricular tachycardia)  ECG 12 Lead    Sotalol HCl AF 80 MG tablet      3. Primary hypertension  ECG 12 Lead    Sotalol HCl AF 80 MG tablet        1. The patient's prescription regimen was reviewed and discussed in full detail with the patient.  2. The patient's blood pressure is well controlled today at 124/81 mmHg. He will continue the currently prescribed carvedilol, lisinopril, amlodipine, and clonidine patch as prescribed and his prescriptions for Sotalol and Eliquis were refilled today.  3.  Patient does have atrial fibrillation burden of about 7%.  This is prior to starting the sotalol.  He denies any palpitations therefore we will continue his sotalol as currently prescribed as well as his carvedilol.  He also will continue Eliquis for anticoagulation.  Currently denies any signs or symptoms of bleeding.  4.  The patient's heart monitor and echocardiogram results were discussed in full detail with the patient.  5.  Informed of signs and symptoms of ACS and advised to seek emergent treatment for any new worsening symptoms.  Patient also advised sooner follow-up as needed.  Also advised to follow-up with family doctor as needed  This note is dictated utilizing voice recognition software.  Although this record has been proof read, transcriptional errors may still be present. If questions occur regarding the content of this record please do not hesitate to call our office.  I have reviewed and confirmed the accuracy of the ROS as documented by the MA/LPN/RN ELEAZAR Cruz      Assessment  1. Atrial fibrillation  2. Chronic arterial hypertension  3. Type 2 diabetes mellitus  4. Hyperlipidemia    Return in about 9 months (around 1/12/2024), or if symptoms worsen or fail to  improve.    Diagnoses and all orders for this visit:    1. New onset a-fib (Primary)  -     ECG 12 Lead  -     Sotalol HCl AF 80 MG tablet; Take 80mg BID- starting Monday 1/2/23.  Dispense: 180 tablet; Refill: 3  -     apixaban (ELIQUIS) 5 MG tablet tablet; Take 1 tablet by mouth 2 (Two) Times a Day.  Dispense: 180 tablet; Refill: 3    2. VT (ventricular tachycardia)  -     ECG 12 Lead  -     Sotalol HCl AF 80 MG tablet; Take 80mg BID- starting Monday 1/2/23.  Dispense: 180 tablet; Refill: 3    3. Primary hypertension  -     ECG 12 Lead  -     Sotalol HCl AF 80 MG tablet; Take 80mg BID- starting Monday 1/2/23.  Dispense: 180 tablet; Refill: 3        Robson Burton  reports that he has been smoking cigarettes. He has a 20.00 pack-year smoking history. He has never used smokeless tobacco.. I have educated him on the risk of diseases from using tobacco products.          MEDS ORDERED DURING VISIT:  New Medications Ordered This Visit   Medications   • Sotalol HCl AF 80 MG tablet     Sig: Take 80mg BID- starting Monday 1/2/23.     Dispense:  180 tablet     Refill:  3   • apixaban (ELIQUIS) 5 MG tablet tablet     Sig: Take 1 tablet by mouth 2 (Two) Times a Day.     Dispense:  180 tablet     Refill:  3           This document has been electronically signed by ELEAZAR Cruz Jr.  April 13, 2023 01:02 EDT     Transcribed from ambient dictation for ELEAZAR Cruz by Amandeep Nye.  04/12/23   13:22 EDT    Patient or patient representative verbalized consent to the visit recording.  I have personally performed the services described in this document as transcribed by the above individual, and it is both accurate and complete.

## 2023-08-29 DIAGNOSIS — I10 PRIMARY HYPERTENSION: ICD-10-CM

## 2023-08-29 RX ORDER — CLONIDINE 0.1 MG/24H
1 PATCH, EXTENDED RELEASE TRANSDERMAL WEEKLY
Qty: 4 PATCH | Refills: 6 | Status: SHIPPED | OUTPATIENT
Start: 2023-08-29

## 2023-12-11 DIAGNOSIS — I48.91 NEW ONSET A-FIB: ICD-10-CM

## 2023-12-11 DIAGNOSIS — I47.20 VT (VENTRICULAR TACHYCARDIA): ICD-10-CM

## 2023-12-11 DIAGNOSIS — I10 PRIMARY HYPERTENSION: ICD-10-CM

## 2024-04-22 DIAGNOSIS — I10 PRIMARY HYPERTENSION: ICD-10-CM

## 2024-04-22 RX ORDER — CLONIDINE 0.1 MG/24H
1 PATCH, EXTENDED RELEASE TRANSDERMAL WEEKLY
Qty: 4 PATCH | Refills: 6 | Status: SHIPPED | OUTPATIENT
Start: 2024-04-22

## 2024-06-17 DIAGNOSIS — I10 PRIMARY HYPERTENSION: ICD-10-CM

## 2024-06-17 DIAGNOSIS — I48.91 NEW ONSET A-FIB: ICD-10-CM

## 2024-06-17 DIAGNOSIS — I47.20 VT (VENTRICULAR TACHYCARDIA): ICD-10-CM

## 2024-08-06 DIAGNOSIS — I48.91 NEW ONSET A-FIB: ICD-10-CM

## 2024-08-06 DIAGNOSIS — I47.20 VT (VENTRICULAR TACHYCARDIA): ICD-10-CM

## 2024-08-06 DIAGNOSIS — I10 PRIMARY HYPERTENSION: ICD-10-CM

## 2024-08-06 NOTE — TELEPHONE ENCOUNTER
Received faxed RF request from [facility] for Eliquis and Sotalol. Will give 1 month Rf, as pt has missed several appt, last seen 4/2023.